# Patient Record
Sex: FEMALE | Race: WHITE
[De-identification: names, ages, dates, MRNs, and addresses within clinical notes are randomized per-mention and may not be internally consistent; named-entity substitution may affect disease eponyms.]

---

## 2020-03-19 ENCOUNTER — HOSPITAL ENCOUNTER (INPATIENT)
Dept: HOSPITAL 11 - JP.OB | Age: 35
LOS: 2 days | Discharge: HOME | End: 2020-03-21
Attending: ADVANCED PRACTICE MIDWIFE | Admitting: ADVANCED PRACTICE MIDWIFE
Payer: COMMERCIAL

## 2020-03-19 DIAGNOSIS — O09.813: ICD-10-CM

## 2020-03-19 DIAGNOSIS — Z3A.41: ICD-10-CM

## 2020-03-19 DIAGNOSIS — O48.0: Primary | ICD-10-CM

## 2020-03-19 PROCEDURE — 3E0R3BZ INTRODUCTION OF ANESTHETIC AGENT INTO SPINAL CANAL, PERCUTANEOUS APPROACH: ICD-10-PCS | Performed by: ADVANCED PRACTICE MIDWIFE

## 2020-03-19 PROCEDURE — 00HU33Z INSERTION OF INFUSION DEVICE INTO SPINAL CANAL, PERCUTANEOUS APPROACH: ICD-10-PCS | Performed by: ADVANCED PRACTICE MIDWIFE

## 2020-03-19 PROCEDURE — 3E033VJ INTRODUCTION OF OTHER HORMONE INTO PERIPHERAL VEIN, PERCUTANEOUS APPROACH: ICD-10-PCS | Performed by: ADVANCED PRACTICE MIDWIFE

## 2020-03-19 PROCEDURE — 0KQM0ZZ REPAIR PERINEUM MUSCLE, OPEN APPROACH: ICD-10-PCS | Performed by: ADVANCED PRACTICE MIDWIFE

## 2020-03-19 PROCEDURE — 3E0P7VZ INTRODUCTION OF HORMONE INTO FEMALE REPRODUCTIVE, VIA NATURAL OR ARTIFICIAL OPENING: ICD-10-PCS | Performed by: ADVANCED PRACTICE MIDWIFE

## 2020-03-19 RX ADMIN — SODIUM CHLORIDE SCH MLS/HR: 9 INJECTION, SOLUTION INTRAVENOUS at 16:10

## 2020-03-19 RX ADMIN — SODIUM CHLORIDE SCH MLS/HR: 9 INJECTION, SOLUTION INTRAVENOUS at 11:28

## 2020-03-19 RX ADMIN — SODIUM CHLORIDE SCH MLS/HR: 9 INJECTION, SOLUTION INTRAVENOUS at 19:50

## 2020-03-19 NOTE — US
Fetal BPP wo NST

 

INDICATION:  post dates induction

 

COMPARISON:   None

 

FINDINGS:

Single live IUP in:    Cephalic position.

Fetal heart rate:   142 BPM.

Biophysical profile score:  8/8.

JOSE DANIEL:  17.0 cm.   

 

IMPRESSION:  Normal biophysical profile score of 8/8.

## 2020-03-19 NOTE — PCM.PNLD
Labor Progress Note





- VS & Meds


Vital Signs: 


 Last Vital Signs











Temp  35.6 C L  03/19/20 14:45


 


Pulse  83   03/19/20 14:45


 


Resp  18   03/19/20 14:45


 


BP  114/65   03/19/20 14:45


 


Pulse Ox  97   03/19/20 14:45











Active Medications: 


 Current Medications





Ephedrine Sulfate (Ephedrine Sulfate)  10 mg IVPUSH ASDIRECTED PRN


   PRN Reason: Hypotension


Penicillin G Potassium 2.5 (millunits/ Sodium Chloride)  50 mls @ 100 mls/hr IV 

Q4H [facility]


   Last Admin: 03/19/20 16:10 Dose:  100 mls/hr


Oxytocin/Sodium Chloride (Pitocin In Ns 20 Units/1,000 Ml)  20 unit in 1,000 

mls @ 6 mls/hr IV TITRATE [facility]; Protocol


   Last Titration: 03/19/20 16:14 Dose:  11 munits/min, 33 mls/hr


Lactated Ringer's (Ringers, Lactated)  1,000 mls @ 150 mls/hr IV ASDIRECTED [facility]


   Last Admin: 03/19/20 08:13 Dose:  150 mls/hr


Ondansetron HCl (Zofran)  4 mg IV Q4H PRN


   PRN Reason: Nausea/Vomiting


Sodium Chloride (Saline Flush)  10 ml FLUSH ASDIRECTED PRN


   PRN Reason: Keep Vein Open


Sodium Chloride (Saline Flush)  10 ml FLUSH ASDIRECTED PRN


   PRN Reason: Keep Vein Open





Discontinued Medications





Penicillin G Potassium 5 (millunits/ Sodium Chloride)  50 mls @ 100 mls/hr IV 

ONETIME ONE


   Stop: 03/19/20 08:29


   Last Admin: 03/19/20 07:22 Dose:  100 mls/hr


Lactated Ringer's (Ringers, Lactated)  1,000 mls @ 999 mls/hr IV .BOLUS ONE


   Stop: 03/19/20 09:00


Misoprostol (Cytotec)  50 mcg VAG ONETIME ONE


   Stop: 03/19/20 07:16


   Last Admin: 03/19/20 08:11 Dose:  50 mcg











- Uterine Contractions


Uterine Monitoring Mode: Palpation


Contraction Frequency (min): 1


Contraction Duration (sec): 40-70


Contraction Intensity: Mild to Moderate


Uterine Resting Tone: Soft





- Fetal Monitoring


Fetal Heart Rate (FHR) Variability: Moderate (6-25 bmp)





- Vaginal Exam


Dilation (cm): 2-3


Effacement (Percent): 80


Station: -2


Cervical Position: Posterior


Sterile Vaginal Exam Performed By: Therese Garcia





- Labor Progress (Free Text)


Labor Progress: 





03/19/2020


Patient now on pitocin and mireille regularly


SVE-slight change


FHTs category one


Patient tolerating pain with position change





plan-


Continue to monitor labor


Continue to monitor FHTs


Pain management per patient request


Plan and anticipate a vaginal delivery

## 2020-03-19 NOTE — ANES
DATE OF SERVICE:  03/19/2020

 

INDICATION:  Nichelle is a 35-year-old female, patient of Kimberly Aldridge in our OB unit.

She is G2, P1 patient with 41-week gestation.  I was called to assess her for labor

epidural.  Upon discussing history as well as reviewing lab work, found no contraindication

to labor epidural placement and consent was received.

 

DESCRIPTION OF PROCEDURE:  I had her seated at the edge of the bed.  Betadine prep x3 to

lumbar region.  Sterile drape was placed, 1% lidocaine skin wheal as well as deep at the L3-

L4 region.  A 17-gauge Tuohy was placed to loss of resistance with ease.  Negative CSF,

negative heme, negative paresthesia.  I placed silicone catheter to 14 cm.  Needle was

removed and a test dose was given of 3 mL of 1.5% lidocaine and 1:200,000 epinephrine.  The

catheter was then secured.  I placed the patient in the supine position.  Dosed her with 12

mL of 0.2% ropivacaine and began infusion of that same 0.2%.  She tolerated the procedure.

Please refer to nurse's notes for vital signs and neuro status, which were unchanged and

within normal limits.  I reported off to the nurse and discussed with the patient reviewing

procedure. She again tolerated quite well.

 

 

 

 

Enzo Jameson CRNA

DD:  03/19/2020 18:10:27

DT:  03/19/2020 20:04:43

Job #:  [id number]

## 2020-03-20 RX ADMIN — SODIUM CHLORIDE SCH: 9 INJECTION, SOLUTION INTRAVENOUS at 00:18

## 2020-03-20 NOTE — PCM.DEL
L & D Note





- General Info


Date of Service: 20


Mother's Due Date: 20





- Delivery Note


Cervical Ripening Method: Misoprostil, Oxytocin


Delivery Outcome: Livebirth


Infant Delivery Method: Spontaneous Vaginal Delivery-Single


Infant Delivery Mode: Spontaneous


Birth Presentation: Vertex


Nuchal Cord: Present, Reduced


Anesthesia Type: Epidural


Laceration: 2nd Degree, Perineal


Suture type: Chromic


Suture size: 3-0


Placenta: Intact, Spontaneous


Cord: 3 Vessels


Estimated Blood Loss: 250


Resuscitation Needed: No


: Suctioned, Bulb Syringe, Warmed, [place] Used


APGAR Score 1 min: 8


APGAR Score 5 min: 8


Second Stage Interventions: Reports: Second Nurse Assessed Progress of Descent, 

Second Nurse Reviewed Contraction Pattern, Second Nurse Reviewed Fetal Heart 

Tones, Encouragement Given, Pushing Effectively, Pushing, Feet in Foot Rests, 

Pushing, Left Side, Pushing, McRobert's Position, Pushing, Pulls Own Legs Back, 

Pushing, Right Side, Pushing, Squat Bar Pulling on Device, Pushing, Squat Bar 

Pulling on Sheet, Pushing, Stirrups/Leg Supports


Delivery Comments (Free Text/Narrative):: 





2020


36 yo  delivered a viable male infant in GRISELDA position over and intact 

perineum at 2321 on 2020.  Infant did have a slow decent, with  

for a long period of time.  Mother pushed in multiple positions.  Infant then 

had a nuchal cord times one easily reduced and than infant was delivered and 

placed on prewarmed blanket on mother abdomen.  Infant had terminal meconium on 

delivery.  Infant was slow to stimulate so cord was double clamped and infant 

was brought to the warmer for initial assessment.  Infant began to cry out 

vigorously and pink in color at that time.  Bulb suction, warming, stimulating 

and drying was done.  APGARS-8/8, weight-7lbs 11oz, length-21.5inches, Second 

degree perineal repaired in usual fashion.  Placenta spontaneous and intact.  

EBL-250. No other lacerations noted of vagina, perineum, cervix, and rectum. 

Infant now stable and skin to skin with mother in labor and delivery room.


Stages of labor-


1st stage-1605-2213


2nd qssgq-[id number]


3rd stage-232-232











- General Info


Date of Service: 20


Functional Status: Reports: Pain Controlled





- Review of Systems


General: Reports: No Symptoms


HEENT: Reports: No Symptoms


Pulmonary: Reports: No Symptoms


Cardiovascular: Reports: No Symptoms


Gastrointestinal: Reports: No Symptoms


Genitourinary: Reports: No Symptoms


Musculoskeletal: Reports: No Symptoms


Skin: Reports: No Symptoms


Neurological: Reports: No Symptoms


Psychiatric: Reports: No Symptoms





- Patient Data


Vitals - Most Recent: 


 Last Vital Signs











Temp  35.1 C L  20 20:45


 


Pulse  77   20 20:45


 


Resp  16   20 20:45


 


BP  113/71   20 20:45


 


Pulse Ox  97   20 20:45











Weight - Most Recent: 83.915 kg


I&O - Last 24 Hours: 


 Intake & Output











 20





 14:59 22:59 06:59


 


Intake Total 500  


 


Balance 500  











Lab Results Last 24 Hours: 


 Laboratory Results - last 24 hr











  20 Range/Units





  06:52 06:52 06:52 


 


WBC  9.0    (4.5-11.0)  K/uL


 


RBC  4.31    (3.30-5.50)  M/uL


 


Hgb  11.9 L    (12.0-15.0)  g/dL


 


Hct  37.4    (36.0-48.0)  %


 


MCV  87    (80-98)  fL


 


MCH  28    (27-31)  pg


 


MCHC  32    (32-36)  %


 


Plt Count  225    (150-400)  K/uL


 


Urine Color   Yellow   (YELLOW)  


 


Urine Appearance   Cloudy A   (CLEAR)  


 


Urine pH   7.0   (5.0-8.0)  


 


Ur Specific Gravity   1.025   (1.008-1.030)  


 


Urine Protein   Negative   (NEGATIVE)  mg/dL


 


Urine Glucose (UA)   Negative   (NEGATIVE)  mg/dL


 


Urine Ketones   Negative   (NEGATIVE)  mg/dL


 


Urine Occult Blood   Trace-intact H   (NEGATIVE)  


 


Urine Nitrite   Negative   (NEGATIVE)  


 


Urine Bilirubin   Negative   (NEGATIVE)  


 


Urine Urobilinogen   0.2   (0.2-1.0)  EU/dL


 


Ur Leukocyte Esterase   Negative   (NEGATIVE)  


 


Urine RBC   0-5   (0-5)  


 


Urine WBC   Not seen   (0-5)  


 


Ur Epithelial Cells   Many   


 


Amorphous Sediment   Not seen   


 


Urine Bacteria   Many   


 


Urine Mucus   Not seen   


 


Urine Opiates Screen    Negative  (NEGATIVE)  


 


Ur Oxycodone Screen    Negative  (NEGATIVE)  


 


Urine Methadone Screen    Negative  (NEGATIVE)  


 


Ur Propoxyphene Screen    Negative  (NEGATIVE)  


 


Ur Barbiturates Screen    Negative  (NEGATIVE)  


 


Ur Tricyclics Screen    Negative  (NEGATIVE)  


 


Ur Phencyclidine Scrn    Negative  (NEGATIVE)  


 


Ur Amphetamine Screen    Negative  (NEGATIVE)  


 


U Methamphetamines Scrn    Negative  (NEGATIVE)  


 


Urine MDMA Screen    Negative  (NEGATIVE)  


 


U Benzodiazepines Scrn    Negative  (NEGATIVE)  


 


U Cocaine Metab Screen    Negative  (NEGATIVE)  


 


U Marijuana (THC) Screen    Negative  (NEGATIVE)  











Med Orders - Current: 


 Current Medications





Acetaminophen (Tylenol Bulk Bottle)  325 - 650 mg PO Q4H PRN


   PRN Reason: Pain


Docusate Sodium (Colace)  100 mg PO BID PRN


   PRN Reason: Constipation


Ephedrine Sulfate (Ephedrine Sulfate)  10 mg IVPUSH ASDIRECTED PRN


   PRN Reason: Hypotension


Penicillin G Potassium 2.5 (millunits/ Sodium Chloride)  50 mls @ 100 mls/hr IV 

Q4H [facility]


   Last Admin: 20 19:50 Dose:  100 mls/hr


Oxytocin/Sodium Chloride (Pitocin In Ns 20 Units/1,000 Ml)  20 unit in 1,000 

mls @ 6 mls/hr IV TITRATE [facility]; Protocol


   Last Titration: 20 19:51 Dose:  39 mls/hr


Lactated Ringer's (Ringers, Lactated)  1,000 mls @ 25 mls/hr IV ASDIRECTED BREA


Ibuprofen (Motrin Bulk Bottle)  600 mg PO Q6H PRN


   PRN Reason: Pain


Ondansetron HCl (Zofran)  4 mg IV Q4H PRN


   PRN Reason: Nausea/Vomiting


Sodium Chloride (Saline Flush)  10 ml FLUSH ASDIRECTED PRN


   PRN Reason: Keep Vein Open


Sodium Chloride (Saline Flush)  10 ml FLUSH ASDIRECTED PRN


   PRN Reason: Keep Vein Open





Discontinued Medications





Benzocaine (Qkji-C-Lmiitob 20% Spray)  0 gm TOP ONETIME ONE


   Stop: 20 23:45


Carboprost Tromethamine (Hemabate Ds) Confirm Administered Dose 250 mcg .ROUTE 

.STK-MED ONE


   Stop: 20 22:11


Emollient Ointment (Lansinoh Hpa)  1 gm TOP ONETIME ONE


   Stop: 20 23:45


Hydrocortisone (Proctozone-Hc 2.5% Crm)  1 gm TOP ONETIME ONE


   Stop: 20 23:45


Penicillin G Potassium 5 (millunits/ Sodium Chloride)  50 mls @ 100 mls/hr IV 

ONETIME ONE


   Stop: 20 08:29


   Last Admin: 20 07:22 Dose:  100 mls/hr


Lactated Ringer's (Ringers, Lactated)  1,000 mls @ 999 mls/hr IV .BOLUS ONE


   Stop: 20 09:00


   Last Admin: 20 17:30 Dose:  999 mls/hr


Lactated Ringer's (Ringers, Lactated)  1,000 mls @ 150 mls/hr IV ASDIRECTED [facility]


   Last Admin: 20 17:30 Dose:  150 mls/hr


Ropivacaine (Naropin 0.2%) Confirm Administered Dose 100 mls @ as directed 

.ROUTE .STK-MED ONE


   Stop: 20 18:15


Methylergonovine Maleate (Methergine) Confirm Administered Dose 0.2 mg .ROUTE 

.STK-MED ONE


   Stop: 20 22:12


Misoprostol (Cytotec)  50 mcg VAG ONETIME ONE


   Stop: 20 07:16


   Last Admin: 20 08:11 Dose:  50 mcg


Misoprostol (Cytotec) Confirm Administered Dose 800 mcg .ROUTE .STK-MED ONE


   Stop: 20 22:11


Witch Hazel (Tucks)  1 pad TOP ONETIME ONE


   Stop: 20 23:45











- Exam


General: Alert, Oriented, Cooperative


HEENT: Pupils Equal, Pupils Reactive, EOMI, Mucous Membr. Moist/Pink


Neck: Supple


Lungs: Clear to Auscultation, Normal Respiratory Effort


Cardiovascular: Regular Rate, Regular Rhythm


GI/Abdominal Exam: Normal Bowel Sounds, Soft, Non-Tender, No Organomegaly, No 

Distention, No Abnormal Bruit, No Mass, Pelvis Stable


 (Female) Exam: Normal External Exam, Normal Speculum Exam, Normal Bimanual 

Exam, Enlarged Uterus, Vaginal Bleeding


Back Exam: Normal Inspection, Full Range of Motion


Extremities: Normal Inspection, Normal Range of Motion, Non-Tender, No Pedal 

Edema, Normal Capillary Refill


Skin: Warm, Dry, Intact


Wound/Incisions: Healing Well


Neurological: No New Focal Deficit


Psy/Mental Status: Alert, Normal Affect, Normal Mood





- Problem List & Annotations


(1) Pregnancy


SNOMED Code(s): 10040721


   Code(s): Z34.90 - ENCNTR FOR SUPRVSN OF NORMAL PREGNANCY, UNSP, UNSP 

TRIMESTER   Status: Acute   Current Visit: Yes   


Qualifiers: 


   Weeks of gestation: 41 weeks   Qualified Code(s): Z3A.41 - 41 weeks 

gestation of pregnancy   





(2) Post-dates pregnancy


SNOMED Code(s): 29798910


   Code(s): O48.0 - POST-TERM PREGNANCY   Status: Acute   Current Visit: Yes   





(3) Pregnancy conceived through in vitro fertilization


SNOMED Code(s): 17707882


   Code(s): O09.819 - SUPRVSN OF PREG RSLT FROM ASSISTED REPRODCTV TECH, UNSP 

TRI   Status: Acute   Current Visit: Yes   





(4) Positive GBS test


SNOMED Code(s): 102266120, 034515908


   Code(s): B95.1 - STREPTOCOCCUS, GROUP B, CAUSING DISEASES CLASSD ELSWHR   

Status: Acute   Current Visit: Yes   





(5) Encounter for induction of labor


SNOMED Code(s): 187595756


   Code(s): Z34.90 - ENCNTR FOR SUPRVSN OF NORMAL PREGNANCY, UNSP, UNSP 

TRIMESTER   Status: Acute   Current Visit: Yes   





(6) Vaginal delivery


SNOMED Code(s): 915000651


   Code(s): O80 - ENCOUNTER FOR FULL-TERM UNCOMPLICATED DELIVERY   Status: 

Acute   Current Visit: Yes   





(7) Perineal laceration during delivery


SNOMED Code(s): 504073265


   Code(s): O70.9 - PERINEAL LACERATION DURING DELIVERY, UNSPECIFIED   Status: 

Acute   Current Visit: Yes   


Qualifiers: 


   Perineal laceration degree: second degree   Qualified Code(s): O70.1 - 

Second degree perineal laceration during delivery   





(8) Breastfeeding (infant)


SNOMED Code(s): 555720025


   Code(s): Z78.9 - OTHER SPECIFIED HEALTH STATUS   Status: Acute   Current 

Visit: Yes   





- Problem List Review


Problem List Initiated/Reviewed/Updated: Yes





- My Orders


Last 24 Hours: 


My Active Orders





20 06:30


Sodium Chloride 0.9% [Saline Flush]   10 ml FLUSH ASDIRECTED PRN 


Peripheral IV Insertion Adult [OM.PC] Routine 





20 07:29


May Shower [RC] ASDIRECTED 


Notify Provider Vital Signs [RC] PRN 


Up ad Kandy [RC] ASDIRECTED 


Ondansetron [Zofran]   4 mg IV Q4H PRN 


Sodium Chloride 0.9% [Saline Flush]   10 ml FLUSH ASDIRECTED PRN 


ePHEDrine [ePHEDrine sulfate]   10 mg IVPUSH ASDIRECTED PRN 


Resuscitation Status Routine 





20 07:30


Patient Status [ADT] Routine 


Communication Order [RC] ASDIRECTED 


Communication Order [RC] ASDIRECTED 


Fetal Heart Tones [RC] PER UNIT ROUTINE 


Fetal Non Stress Test [RC] Click to Edit 


Insert Urinary Catheter [OM.PC] ASDIRECTED 


Local Anesthetic Infusion Pump [RC] ASDIRECTED 


Notify Provider [RC] PRN 


PCEA Epidural [RC] ASDIRECTED 


Urinary Catheter Assessment [RC] ASDIRECTED 


Epidural Catheter Management [OM.PC] Urgent 


Saline Lock Insert [OM.PC] Routine 





20 07:45


Oxytocin/Normal Saline [Pitocin in NS 20 Units/1,000 ML] 20 unit in 1,000 ml IV 

TITRATE 





20 08:14


Communication Order [RC] Per Unit Routine 


Oxygen Therapy [RC] ASDIRECTED 


Pulse Oximetry [RC] ASDIRECTED 


Verify Patient Consent Obtain [RC] ASDIRECTED 


Vital Signs [RC] PER UNIT ROUTINE 


Medication Discontinuation Instructions [OM.PC] Routine 





20 12:00


Penicillin G Potassium [Pfizerpen] 2.5 millunits   Sodium Chloride 0.9% [Normal 

Saline] 50 ml IV Q4H 





20 21:30


Lactated Ringers [Ringers, Lactated] 1,000 ml IV ASDIRECTED 





20 23:44


Patient Status [ADT] Routine 


Vital Signs [RC] PFP 


Acetaminophen [Tylenol Bulk Bottle]   325 - 650 mg PO Q4H PRN 


Docusate Sodium [Colace]   100 mg PO BID PRN 


Ibuprofen [Motrin Bulk Bottle]   600 mg PO Q6H PRN 


Assess Lochia [WOMSER] Per Unit Routine 


Assess Uterine Involution [WOMSER] Per Unit Routine 


DVT/VTE Prophylaxis Reflex [OM.PC] Routine 





20 23:45


Ice Therapy [OM.PC] Per Unit Routine 


Perineal Care [OM.PC] Per Unit Routine 


Sitz Bath [OM.PC] Per Unit Routine 





20 23:46


Antiembolic Devices [RC] .Routine 


VTE/DVT Education [RC] Click to Edit 





20 Breakfast


Regular Diet [DIET] 














- Assessment


Assessment:: 





2020


36 yo  here at 51 0/7 weeks gestation delivered  without complications


Second degree perineal laceration repaired


Breastfeeding


GBS positive treated


IVF pregnancy











- Plan


Plan:: 





2020


36 yo  here at 41 0/7 weeks gestation for a induction of labor


Postdate pregnancy


IVF 


GBS positive


SVE-2/75/-2


Cytotec placed 50mcg vaginally


PCN GK first dose given prior to placement


BPP done and -NST reactive so 8/10





Plan-


Monitor fetal heart tones


Monitor for active labor


PCN per protocol for GBS


Pain management per patient request


Patient can eat regular diet


Patient can be up ad kandy


Plan and anticipate vaginal delivery


-------------------------------------------


2020


Routine postpartum cares


Encourage good perineal care


Support and encourage breastfeeding

## 2020-03-20 NOTE — PCM.PNPP
- General Info


Date of Service: 20


Functional Status: Reports: Pain Controlled





- Review of Systems


General: Reports: No Symptoms


HEENT: Reports: No Symptoms


Pulmonary: Reports: No Symptoms


Cardiovascular: Reports: No Symptoms


Gastrointestinal: Reports: No Symptoms


Genitourinary: Reports: No Symptoms


Musculoskeletal: Reports: No Symptoms


Skin: Reports: No Symptoms


Neurological: Reports: No Symptoms


Psychiatric: Reports: No Symptoms





- Patient Data


Vital Signs - Most Recent: 


 Last Vital Signs











Temp  36.9 C   20 07:45


 


Pulse  76   20 07:45


 


Resp  16   20 07:45


 


BP  107/61   20 07:45


 


Pulse Ox  96   20 07:45











Weight - Most Recent: 83.915 kg


I&O - Last 24 Hours: 


 Intake & Output











 20





 22:59 06:59 14:59


 


Output Total  1100 


 


Balance  -1100 











Lab Results - Last 24 Hours: 


 Laboratory Results - last 24 hr











  20 Range/Units





  07:00 


 


WBC  15.1 H  (4.5-11.0)  K/uL


 


RBC  3.99  (3.30-5.50)  M/uL


 


Hgb  10.9 L  (12.0-15.0)  g/dL


 


Hct  34.7 L  (36.0-48.0)  %


 


MCV  87  (80-98)  fL


 


MCH  27  (27-31)  pg


 


MCHC  31 L  (32-36)  %


 


Plt Count  177  (150-400)  K/uL











Med Orders - Current: 


 Current Medications





Acetaminophen (Tylenol Bulk Bottle)  325 - 650 mg PO Q4H PRN


   PRN Reason: Pain


   Last Admin: 20 00:47 Dose:  650 mg


Docusate Sodium (Colace)  100 mg PO BID PRN


   PRN Reason: Constipation


   Last Admin: 20 07:27 Dose:  100 mg


Ephedrine Sulfate (Ephedrine Sulfate)  10 mg IVPUSH ASDIRECTED PRN


   PRN Reason: Hypotension


Ibuprofen (Motrin Bulk Bottle)  600 mg PO Q6H PRN


   PRN Reason: Pain


   Last Admin: 20 00:44 Dose:  600 mg


Ondansetron HCl (Zofran)  4 mg IV Q4H PRN


   PRN Reason: Nausea/Vomiting


Sodium Chloride (Saline Flush)  10 ml FLUSH ASDIRECTED PRN


   PRN Reason: Keep Vein Open





Discontinued Medications





Benzocaine (Zbmr-Y-Nzirivj 20% Spray)  0 gm TOP ONETIME ONE


   Stop: 20 23:45


   Last Admin: 20 00:45 Dose:  1 spray


Carboprost Tromethamine (Hemabate Ds) Confirm Administered Dose 250 mcg .ROUTE 

.STK-MED ONE


   Stop: 20 22:11


   Last Admin: 20 00:17 Dose:  Not Given


Emollient Ointment (Lansinoh Hpa)  1 gm TOP ONETIME ONE


   Stop: 20 23:45


   Last Admin: 20 00:46 Dose:  1 applic


Hydrocortisone (Proctozone-Hc 2.5% Crm)  1 gm TOP ONETIME ONE


   Stop: 20 23:45


   Last Admin: 20 06:41 Dose:  Not Given


Penicillin G Potassium 5 (millunits/ Sodium Chloride)  50 mls @ 100 mls/hr IV 

ONETIME ONE


   Stop: 20 08:29


   Last Admin: 20 07:22 Dose:  100 mls/hr


Penicillin G Potassium 2.5 (millunits/ Sodium Chloride)  50 mls @ 100 mls/hr IV 

Q4H BREA


   Stop: 20 00:18


   Last Admin: 20 00:18 Dose:  Not Given


Lactated Ringer's (Ringers, Lactated)  1,000 mls @ 999 mls/hr IV .BOLUS ONE


   Stop: 20 09:00


   Last Admin: 20 17:30 Dose:  999 mls/hr


Oxytocin/Sodium Chloride (Pitocin In Ns 20 Units/1,000 Ml)  20 unit in 1,000 

mls @ 6 mls/hr IV TITRATE BREA; Protocol


   Last Titration: 20 19:51 Dose:  39 mls/hr


Lactated Ringer's (Ringers, Lactated)  1,000 mls @ 150 mls/hr IV ASDIRECTED BREA


   Last Admin: 20 17:30 Dose:  150 mls/hr


Ropivacaine (Naropin 0.2%) Confirm Administered Dose 100 mls @ as directed 

.ROUTE .STK-MED ONE


   Stop: 20 18:15


Lactated Ringer's (Ringers, Lactated)  1,000 mls @ 25 mls/hr IV ASDIRECTED [facility]


Methylergonovine Maleate (Methergine) Confirm Administered Dose 0.2 mg .ROUTE 

.STK-MED ONE


   Stop: 20 22:12


   Last Admin: 20 00:17 Dose:  Not Given


Misoprostol (Cytotec)  50 mcg VAG ONETIME ONE


   Stop: 20 07:16


   Last Admin: 20 08:11 Dose:  50 mcg


Misoprostol (Cytotec) Confirm Administered Dose 800 mcg .ROUTE .STK-MED ONE


   Stop: 20 22:11


   Last Admin: 20 00:17 Dose:  Not Given


Sodium Chloride (Saline Flush)  10 ml FLUSH ASDIRECTED PRN


   PRN Reason: Keep Vein Open


Witch Hazel (Tucks)  1 pad TOP ONETIME ONE


   Stop: 20 23:45


   Last Admin: 20 00:45 Dose:  1 pad











- Infant Interaction


Infant Disposition, Postpartum: [place] in Room with Family


Infant Interaction: Holding Infant


Infant Feeding:  Infant; Nursed Well


Support Person: 





- Postpartum Recovery Exam


Fundal Tone: Firm


Fundal Level: At Umbilicus


Fundal Placement: Midline


Lochia Amount: Moderate


Lochia Color: Rubra/Red


Perineum Description: Intact, Minimal Bruising/Swelling





- Exam


General: Alert, Oriented, Cooperative


HEENT: Pupils Equal, Pupils Reactive, EOMI, Mucous Membr. Moist/Pink


Neck: Supple


Lungs: Clear to Auscultation, Normal Respiratory Effort


Cardiovascular: Regular Rate, Regular Rhythm


GI/Abdominal Exam: Normal Bowel Sounds, Soft, Non-Tender, No Organomegaly, No 

Distention, No Abnormal Bruit, No Mass, Pelvis Stable


Extremities: Normal Inspection, Normal Range of Motion, Non-Tender, No Pedal 

Edema, Normal Capillary Refill


Skin: Warm, Dry, Intact


Wound/Incisions: Healing Well


Neurological: No New Focal Deficit


Psy/Mental Status: Alert, Normal Affect, Normal Mood





- Problem List & Annotations


(1) Pregnancy


SNOMED Code(s): 50041618


   Code(s): Z34.90 - ENCNTR FOR SUPRVSN OF NORMAL PREGNANCY, UNSP, UNSP 

TRIMESTER   Status: Acute   Current Visit: Yes   


Qualifiers: 


   Weeks of gestation: 41 weeks   Qualified Code(s): Z3A.41 - 41 weeks 

gestation of pregnancy   





(2) Post-dates pregnancy


SNOMED Code(s): 64142658


   Code(s): O48.0 - POST-TERM PREGNANCY   Status: Acute   Current Visit: Yes   





(3) Pregnancy conceived through in vitro fertilization


SNOMED Code(s): 83375863


   Code(s): O09.819 - SUPRVSN OF PREG RSLT FROM ASSISTED REPRODCTV TECH, UNSP 

TRI   Status: Acute   Current Visit: Yes   





(4) Positive GBS test


SNOMED Code(s): 493447484, 537718458


   Code(s): B95.1 - STREPTOCOCCUS, GROUP B, CAUSING DISEASES CLASSD ELSWHR   

Status: Acute   Current Visit: Yes   





(5) Encounter for induction of labor


SNOMED Code(s): 111757995


   Code(s): Z34.90 - ENCNTR FOR SUPRVSN OF NORMAL PREGNANCY, UNSP, UNSP 

TRIMESTER   Status: Acute   Current Visit: Yes   





(6) Vaginal delivery


SNOMED Code(s): 742154725


   Code(s): O80 - ENCOUNTER FOR FULL-TERM UNCOMPLICATED DELIVERY   Status: 

Acute   Current Visit: Yes   





(7) Perineal laceration during delivery


SNOMED Code(s): 489454059


   Code(s): O70.9 - PERINEAL LACERATION DURING DELIVERY, UNSPECIFIED   Status: 

Acute   Current Visit: Yes   


Qualifiers: 


   Perineal laceration degree: second degree   Qualified Code(s): O70.1 - 

Second degree perineal laceration during delivery   





(8) Breastfeeding (infant)


SNOMED Code(s): 423955491


   Code(s): Z78.9 - OTHER SPECIFIED HEALTH STATUS   Status: Acute   Current 

Visit: Yes   





- Problem List Review


Problem List Initiated/Reviewed/Updated: Yes





- My Orders


Last 24 Hours: 


My Active Orders





20 07:29


May Shower [RC] ASDIRECTED 


Notify Provider Vital Signs [RC] PRN 


Up ad Kandy [RC] ASDIRECTED 


Ondansetron [Zofran]   4 mg IV Q4H PRN 


ePHEDrine [ePHEDrine sulfate]   10 mg IVPUSH ASDIRECTED PRN 


Resuscitation Status Routine 





20 07:30


Patient Status [ADT] Routine 


Insert Urinary Catheter [OM.PC] ASDIRECTED 


Notify Provider [RC] PRN 


Epidural Catheter Management [OM.PC] Urgent 


Saline Lock Insert [OM.PC] Routine 





20 08:14


Communication Order [RC] Per Unit Routine 


Oxygen Therapy [RC] ASDIRECTED 


Pulse Oximetry [RC] ASDIRECTED 


Vital Signs [RC] PER UNIT ROUTINE 


Medication Discontinuation Instructions [OM.PC] Routine 





20 23:44


Patient Status [ADT] Routine 


Vital Signs [RC] PFP 


Acetaminophen [Tylenol Bulk Bottle]   325 - 650 mg PO Q4H PRN 


Docusate Sodium [Colace]   100 mg PO BID PRN 


Ibuprofen [Motrin Bulk Bottle]   600 mg PO Q6H PRN 


Assess Lochia [WOMSER] Per Unit Routine 


Assess Uterine Involution [WOMSER] Per Unit Routine 


DVT/VTE Prophylaxis Reflex [OM.PC] Routine 





20 23:45


Ice Therapy [OM.PC] Per Unit Routine 


Perineal Care [OM.PC] Per Unit Routine 


Sitz Bath [OM.PC] Per Unit Routine 





20 23:46


Antiembolic Devices [RC] .Routine 


VTE/DVT Education [RC] Click to Edit 





20 Breakfast


Regular Diet [DIET] 














- Assessment


Assessment:: 





2020


34 yo  here at 51 0/7 weeks gestation delivered  without complications


Second degree perineal laceration repaired


Breastfeeding


GBS positive treated


IVF pregnancy


------------------------------------


20


 postpartum day one


Fundus firm and bleeding decreasing


Passing gas and voiding


Hgb-10.9


Breastfeeding well


Perineal laceration repaired, minimal swelling, no signs of hematoma


GBS positive-treated in labor














- Plan


Plan:: 





2020


34 yo  here at 41 0/7 weeks gestation for a induction of labor


Postdate pregnancy


IVF 


GBS positive


SVE-2/75/-2


Cytotec placed 50mcg vaginally


PCN GK first dose given prior to placement


BPP done and 6/8-NST reactive so 8/10





Plan-


Monitor fetal heart tones


Monitor for active labor


PCN per protocol for GBS


Pain management per patient request


Patient can eat regular diet


Patient can be up ad kandy


Plan and anticipate vaginal delivery


-------------------------------------------


2020


Routine postpartum cares


Encourage good perineal care


Support and encourage breastfeeding


---------------------------------------------


2020


Continue routine postpartum cares


Continue to encourage good perineal care


Continue to support and encourage breastfeeding


Plan discharge home tomorrow

## 2020-03-21 NOTE — PCM.PNPP
- General Info


Date of Service: 20


Functional Status: Reports: Pain Controlled





- Review of Systems


General: Reports: No Symptoms


HEENT: Reports: No Symptoms


Pulmonary: Reports: No Symptoms


Cardiovascular: Reports: No Symptoms


Gastrointestinal: Reports: No Symptoms


Genitourinary: Reports: No Symptoms


Musculoskeletal: Reports: No Symptoms


Skin: Reports: No Symptoms


Neurological: Reports: No Symptoms


Psychiatric: Reports: No Symptoms





- General Info


Date of Service: 20





- Patient Data


Vital Signs - Most Recent: 


 Last Vital Signs











Temp  36.5 C   20 20:56


 


Pulse  91   20 20:56


 


Resp  16   20 20:56


 


BP  122/68   20 20:56


 


Pulse Ox  98   20 20:56











Weight - Most Recent: 83.915 kg


I&O - Last 24 Hours: 


 Intake & Output











 20





 22:59 06:59 14:59


 


Intake Total   


 


Balance   











Med Orders - Current: 


 Current Medications





Acetaminophen (Tylenol Bulk Bottle)  325 - 650 mg PO Q4H PRN


   PRN Reason: Pain


   Last Admin: 20 00:47 Dose:  650 mg


Docusate Sodium (Colace)  100 mg PO BID PRN


   PRN Reason: Constipation


   Last Admin: 20 07:27 Dose:  100 mg


Ephedrine Sulfate (Ephedrine Sulfate)  10 mg IVPUSH ASDIRECTED PRN


   PRN Reason: Hypotension


Ibuprofen (Motrin Bulk Bottle)  600 mg PO Q6H PRN


   PRN Reason: Pain


   Last Admin: 20 00:44 Dose:  600 mg


Ondansetron HCl (Zofran)  4 mg IV Q4H PRN


   PRN Reason: Nausea/Vomiting


Sodium Chloride (Saline Flush)  10 ml FLUSH ASDIRECTED PRN


   PRN Reason: Keep Vein Open





Discontinued Medications





Benzocaine (Pyam-B-Qojqbrl 20% Spray)  0 gm TOP ONETIME ONE


   Stop: 20 23:45


   Last Admin: 20 00:45 Dose:  1 spray


Carboprost Tromethamine (Hemabate Ds) Confirm Administered Dose 250 mcg .ROUTE 

.STK-MED ONE


   Stop: 20 22:11


   Last Admin: 20 00:17 Dose:  Not Given


Emollient Ointment (Lansinoh Hpa)  1 gm TOP ONETIME ONE


   Stop: 20 23:45


   Last Admin: 20 00:46 Dose:  1 applic


Hydrocortisone (Proctozone-Hc 2.5% Crm)  1 gm TOP ONETIME ONE


   Stop: 20 23:45


   Last Admin: 20 06:41 Dose:  Not Given


Penicillin G Potassium 5 (millunits/ Sodium Chloride)  50 mls @ 100 mls/hr IV 

ONETIME ONE


   Stop: 20 08:29


   Last Admin: 20 07:22 Dose:  100 mls/hr


Penicillin G Potassium 2.5 (millunits/ Sodium Chloride)  50 mls @ 100 mls/hr IV 

Q4H BREA


   Stop: 20 00:18


   Last Admin: 20 00:18 Dose:  Not Given


Lactated Ringer's (Ringers, Lactated)  1,000 mls @ 999 mls/hr IV .BOLUS ONE


   Stop: 20 09:00


   Last Admin: 20 17:30 Dose:  999 mls/hr


Oxytocin/Sodium Chloride (Pitocin In Ns 20 Units/1,000 Ml)  20 unit in 1,000 

mls @ 6 mls/hr IV TITRATE BREA; Protocol


   Last Titration: 20 19:51 Dose:  39 mls/hr


Lactated Ringer's (Ringers, Lactated)  1,000 mls @ 150 mls/hr IV ASDIRECTED BREA


   Last Admin: 20 17:30 Dose:  150 mls/hr


Ropivacaine (Naropin 0.2%) Confirm Administered Dose 100 mls @ as directed 

.ROUTE .STK-MED ONE


   Stop: 20 18:15


Lactated Ringer's (Ringers, Lactated)  1,000 mls @ 25 mls/hr IV ASDIRECTED BREA


Oxytocin/Sodium Chloride (Pitocin In Ns 20 Units/1,000 Ml)  20 unit in 1,000 

mls @ 125 mls/hr IV NOW STA


   Stop: 20 07:34


   Last Admin: 20 23:30 Dose:  999 mls/hr


Methylergonovine Maleate (Methergine) Confirm Administered Dose 0.2 mg .ROUTE 

.STK-MED ONE


   Stop: 20 22:12


   Last Admin: 20 00:17 Dose:  Not Given


Misoprostol (Cytotec)  50 mcg VAG ONETIME ONE


   Stop: 20 07:16


   Last Admin: 20 08:11 Dose:  50 mcg


Misoprostol (Cytotec) Confirm Administered Dose 800 mcg .ROUTE .STK-MED ONE


   Stop: 20 22:11


   Last Admin: 20 00:17 Dose:  Not Given


Sodium Chloride (Saline Flush)  10 ml FLUSH ASDIRECTED PRN


   PRN Reason: Keep Vein Open


Witch Hazel (Tucks)  1 pad TOP ONETIME ONE


   Stop: 20 23:45


   Last Admin: 20 00:45 Dose:  1 pad











- Infant Interaction


Infant Disposition, Postpartum: [place] in Room with Family


Infant Interaction: Holding Infant


Infant Feeding:  Infant; Nursed Well


Support Person: 





- Postpartum Recovery Exam


Fundal Tone: Firm


Fundal Level: At Umbilicus


Fundal Placement: Midline


Lochia Amount: Moderate


Lochia Color: Rubra/Red


Perineum Description: Intact, Minimal Bruising/Swelling


Episiotomy/Laceration: Approximated


Bladder Status: Voiding





- Exam


General: Alert, Oriented


HEENT: Pupils Equal, Pupils Reactive, EOMI, Mucous Membr. Moist/Pink


Neck: Supple


Lungs: Clear to Auscultation, Normal Respiratory Effort


Cardiovascular: Regular Rate, Regular Rhythm


GI/Abdominal Exam: Normal Bowel Sounds, Soft, Non-Tender, No Organomegaly, No 

Distention, No Abnormal Bruit, No Mass, Pelvis Stable


Extremities: Normal Inspection, Normal Range of Motion, Non-Tender, No Pedal 

Edema, Normal Capillary Refill


Skin: Warm, Dry, Intact


Wound/Incisions: Healing Well


Neurological: No New Focal Deficit


Psy/Mental Status: Alert, Normal Affect, Normal Mood





- Problem List & Annotations


(1) Pregnancy


SNOMED Code(s): 15490024


   Code(s): Z34.90 - ENCNTR FOR SUPRVSN OF NORMAL PREGNANCY, UNSP, UNSP 

TRIMESTER   Status: Acute   Current Visit: Yes   


Qualifiers: 


   Weeks of gestation: 41 weeks   Qualified Code(s): Z3A.41 - 41 weeks 

gestation of pregnancy   





(2) Post-dates pregnancy


SNOMED Code(s): 58190328


   Code(s): O48.0 - POST-TERM PREGNANCY   Status: Acute   Current Visit: Yes   





(3) Pregnancy conceived through in vitro fertilization


SNOMED Code(s): 46197738


   Code(s): O09.819 - SUPRVSN OF PREG RSLT FROM ASSISTED REPRODCTV TECH, UNSP 

TRI   Status: Acute   Current Visit: Yes   





(4) Positive GBS test


SNOMED Code(s): 010880908, 550260053


   Code(s): B95.1 - STREPTOCOCCUS, GROUP B, CAUSING DISEASES CLASSD ELSWHR   

Status: Acute   Current Visit: Yes   





(5) Encounter for induction of labor


SNOMED Code(s): 564154224


   Code(s): Z34.90 - ENCNTR FOR SUPRVSN OF NORMAL PREGNANCY, UNSP, UNSP 

TRIMESTER   Status: Acute   Current Visit: Yes   





(6) Vaginal delivery


SNOMED Code(s): 695420317


   Code(s): O80 - ENCOUNTER FOR FULL-TERM UNCOMPLICATED DELIVERY   Status: 

Acute   Current Visit: Yes   





(7) Perineal laceration during delivery


SNOMED Code(s): 879027113


   Code(s): O70.9 - PERINEAL LACERATION DURING DELIVERY, UNSPECIFIED   Status: 

Acute   Current Visit: Yes   


Qualifiers: 


   Perineal laceration degree: second degree   Qualified Code(s): O70.1 - 

Second degree perineal laceration during delivery   





(8) Breastfeeding (infant)


SNOMED Code(s): 412369702


   Code(s): Z78.9 - OTHER SPECIFIED HEALTH STATUS   Status: Acute   Current 

Visit: Yes   





- Problem List Review


Problem List Initiated/Reviewed/Updated: Yes





- Assessment


Assessment:: 





2020


34 yo  here at 41 0/7 weeks gestation delivered  without complications


Second degree perineal laceration repaired


Breastfeeding


GBS positive treated


IVF pregnancy


------------------------------------


20


 postpartum day one


Fundus firm and bleeding decreasing


Passing gas and voiding


Hgb-10.9


Breastfeeding well


Perineal laceration repaired, minimal swelling, no signs of hematoma


GBS positive-treated in labor


-----------------------------------------


20


 postpartum day two


Fundus firm and bleeding decreasing


Passing gas and voiding


Breastfeeding well


Perineal laceration repaired, minimal swelling, no signs of hematoma


GBS positive-treated in labor


Desires discharge home today














- Plan


Plan:: 





2020


34 yo  here at 41 0/7 weeks gestation for a induction of labor


Postdate pregnancy


IVF 


GBS positive


SVE-2/75/-2


Cytotec placed 50mcg vaginally


PCN GK first dose given prior to placement


BPP done and -NST reactive so 8/10





Plan-


Monitor fetal heart tones


Monitor for active labor


PCN per protocol for GBS


Pain management per patient request


Patient can eat regular diet


Patient can be up ad fernando


Plan and anticipate vaginal delivery


-------------------------------------------


2020


Routine postpartum cares


Encourage good perineal care


Support and encourage breastfeeding


---------------------------------------------


2020


Continue routine postpartum cares


Continue to encourage good perineal care


Continue to support and encourage breastfeeding


Plan discharge home tomorrow


--------------------------------------------------


2020


Continue routine postpartum cares


Continue to encourage good perineal care


Continue to support and encourage breastfeeding


Discharge home today


To see me in six weeks in the clinic for postpartum visit

## 2022-02-13 ENCOUNTER — HOSPITAL ENCOUNTER (INPATIENT)
Dept: HOSPITAL 11 - JP.OBCHECK | Age: 37
LOS: 2 days | Discharge: HOME | End: 2022-02-15
Attending: OBSTETRICS & GYNECOLOGY | Admitting: OBSTETRICS & GYNECOLOGY
Payer: COMMERCIAL

## 2022-02-13 DIAGNOSIS — O42.92: Primary | ICD-10-CM

## 2022-02-13 DIAGNOSIS — U07.1: ICD-10-CM

## 2022-02-13 DIAGNOSIS — O48.0: ICD-10-CM

## 2022-02-13 DIAGNOSIS — Z3A.40: ICD-10-CM

## 2022-02-13 LAB — SARS-COV-2 RNA RESP QL NAA+PROBE: POSITIVE

## 2022-02-14 PROCEDURE — 0KQM0ZZ REPAIR PERINEUM MUSCLE, OPEN APPROACH: ICD-10-PCS | Performed by: OBSTETRICS & GYNECOLOGY

## 2022-08-28 ENCOUNTER — HOSPITAL ENCOUNTER (EMERGENCY)
Dept: HOSPITAL 11 - JP.ED | Age: 37
Discharge: HOME | End: 2022-08-28
Payer: COMMERCIAL

## 2022-08-28 DIAGNOSIS — Z88.1: ICD-10-CM

## 2022-08-28 DIAGNOSIS — Z86.16: ICD-10-CM

## 2022-08-28 DIAGNOSIS — G44.209: Primary | ICD-10-CM

## 2022-08-28 PROCEDURE — 96372 THER/PROPH/DIAG INJ SC/IM: CPT

## 2022-08-28 PROCEDURE — 99283 EMERGENCY DEPT VISIT LOW MDM: CPT

## 2022-12-21 ENCOUNTER — HOSPITAL ENCOUNTER (EMERGENCY)
Dept: HOSPITAL 11 - JP.ED | Age: 37
Discharge: HOME | End: 2022-12-21
Payer: COMMERCIAL

## 2022-12-21 DIAGNOSIS — Z86.16: ICD-10-CM

## 2022-12-21 DIAGNOSIS — H53.8: Primary | ICD-10-CM

## 2022-12-21 DIAGNOSIS — Z88.1: ICD-10-CM
